# Patient Record
Sex: FEMALE | Race: WHITE | NOT HISPANIC OR LATINO | Employment: OTHER | URBAN - METROPOLITAN AREA
[De-identification: names, ages, dates, MRNs, and addresses within clinical notes are randomized per-mention and may not be internally consistent; named-entity substitution may affect disease eponyms.]

---

## 2017-01-09 ENCOUNTER — HOSPITAL ENCOUNTER (OUTPATIENT)
Dept: LAB | Facility: MEDICAL CENTER | Age: 67
End: 2017-01-09
Attending: FAMILY MEDICINE
Payer: MEDICARE

## 2017-01-09 LAB
25(OH)D3 SERPL-MCNC: 44 NG/ML (ref 30–100)
DHEA-S SERPL-MCNC: 96.5 UG/DL (ref 9.4–246)
EST. AVERAGE GLUCOSE BLD GHB EST-MCNC: 120 MG/DL
ESTRADIOL SERPL-MCNC: <20 PG/ML
HBA1C MFR BLD: 5.8 % (ref 0–5.6)
PROGEST SERPL-MCNC: 3.59 NG/ML

## 2017-01-09 PROCEDURE — 83525 ASSAY OF INSULIN: CPT

## 2017-01-09 PROCEDURE — 82306 VITAMIN D 25 HYDROXY: CPT

## 2017-01-09 PROCEDURE — 84403 ASSAY OF TOTAL TESTOSTERONE: CPT

## 2017-01-09 PROCEDURE — 82627 DEHYDROEPIANDROSTERONE: CPT

## 2017-01-09 PROCEDURE — 82947 ASSAY GLUCOSE BLOOD QUANT: CPT

## 2017-01-09 PROCEDURE — 84144 ASSAY OF PROGESTERONE: CPT

## 2017-01-09 PROCEDURE — 36415 COLL VENOUS BLD VENIPUNCTURE: CPT

## 2017-01-09 PROCEDURE — 83036 HEMOGLOBIN GLYCOSYLATED A1C: CPT

## 2017-01-09 PROCEDURE — 84270 ASSAY OF SEX HORMONE GLOBUL: CPT

## 2017-01-09 PROCEDURE — 82670 ASSAY OF TOTAL ESTRADIOL: CPT

## 2017-01-10 LAB — GLUCOSE SERPL-MCNC: 87 MG/DL (ref 65–99)

## 2017-01-11 LAB — INSULIN P FAST SERPL-ACNC: 10 UIU/ML (ref 3–19)

## 2017-01-12 LAB
SHBG SERPL-SCNC: 52 NMOL/L (ref 30–135)
TESTOST FREE SERPL-MCNC: 3.7 PG/ML (ref 0.6–3.8)
TESTOST SERPL-MCNC: 29 NG/DL (ref 5–32)

## 2017-05-03 ENCOUNTER — TELEPHONE (OUTPATIENT)
Dept: MEDICAL GROUP | Facility: MEDICAL CENTER | Age: 67
End: 2017-05-03

## 2017-05-03 ENCOUNTER — DOCUMENTATION (OUTPATIENT)
Dept: MEDICAL GROUP | Facility: MEDICAL CENTER | Age: 67
End: 2017-05-03

## 2017-05-03 NOTE — TELEPHONE ENCOUNTER
ANNUAL WELLNESS VISIT PRE-VISIT PLANNING     1.  Reviewed note from last office visit with PCP: YES 7/28/16    2.  If any orders were placed at last visit, do we have Results/Consult Notes?        •  Labs - Labs were not ordered at last office visit.       •  Imaging - Imaging was not ordered at last office visit.       •  Referrals - No referrals were ordered at last office visit.    3.  Immunizations were updated in [facility] using WebIZ?: Yes       •  WebIZ Recommendations: HEPATITIS A , HEPATITIS B, PREVNAR (PCV13) , TDAP and ZOSTAVAX (Shingles)       •  Is patient due for Tdap? YES. Patient was notified of copay.       •  Is patient due for Shingles? YES. Patient was not notified of copay.     4.  Patient is due for the following Health Maintenance Topics:   Health Maintenance Due   Topic Date Due   • Annual Wellness Visit  1950   • IMM DTaP/Tdap/Td Vaccine (1 - Tdap) 05/27/1967   • PAP SMEAR  05/27/1969   • MAMMOGRAM  05/27/1988   • COLONOSCOPY  06/13/2007   • IMM ZOSTER VACCINE  05/27/2008   • BONE DENSITY  05/27/2013   • IMM PNEUMOCOCCAL 65+ (ADULT) LOW/MEDIUM RISK SERIES (1 of 2 - PCV13) 05/27/2013       - Patient plans to schedule appointment for Colonoscopy/FIT, Immunizations: pt would like to discuss with provider. , Mammogram and Pap.    5.  Reviewed/Updated the following with patient:       •   Preferred Pharmacy? YES       •   Preferred Lab? YES       •   Medications? NO       •   Social History? NO       •   Family History? NO    6.  Care Team Updated:       •   DME Company (gait device, O2, CPAP, etc.): NO       •   Other Specialists (eye doctor, derm, GYN, cardiology, endo, etc): YES    7.  Patient has the following Care Path diagnoses on Problem List:  DEPRESSION     Is patient seeing a counselor, psychiatrist or other healthcare provider regarding their mental health? No, and not interested.     No flowsheet data found.    Interpretation of PHQ-9 Total Score   Score Severity   1-4 Minimal  Depression   5-9 Mild Depression   10-14 Moderate Depression   15-19 Moderately Severe Depression   20-27 Severe Depression       8.  Specialty Comments was updated with diagnosis information provided by [facility]: YES    9.  Patient was advised: “This is a free wellness visit. The provider will screen for medical conditions to help you stay healthy. If you have other concerns to address you may be asked to discuss these at a separate visit or there may be an additional fee.”     10.  Patient was informed to arrive 15 min prior to their scheduled appointment and bring in their medication bottles?  YES

## 2017-05-03 NOTE — TELEPHONE ENCOUNTER
PVP COMPLETE    LAST EYE EXAM-will ask at appointment    ADVANCE DIRECTIVE-will ask at appointment    SPECIALTY COMMENT-n/a      VACCINES-pt would like to discuss with provider.

## 2017-05-10 ENCOUNTER — HOSPITAL ENCOUNTER (OUTPATIENT)
Facility: MEDICAL CENTER | Age: 67
End: 2017-05-10
Attending: FAMILY MEDICINE
Payer: MEDICARE

## 2017-05-10 ENCOUNTER — OFFICE VISIT (OUTPATIENT)
Dept: MEDICAL GROUP | Facility: MEDICAL CENTER | Age: 67
End: 2017-05-10
Payer: MEDICARE

## 2017-05-10 VITALS
TEMPERATURE: 98.2 F | OXYGEN SATURATION: 100 % | HEIGHT: 66 IN | BODY MASS INDEX: 29.89 KG/M2 | SYSTOLIC BLOOD PRESSURE: 114 MMHG | DIASTOLIC BLOOD PRESSURE: 62 MMHG | HEART RATE: 89 BPM | WEIGHT: 186 LBS

## 2017-05-10 DIAGNOSIS — J30.0 VASOMOTOR RHINITIS: ICD-10-CM

## 2017-05-10 DIAGNOSIS — H33.312 RETINAL TEAR OF LEFT EYE: ICD-10-CM

## 2017-05-10 DIAGNOSIS — Z12.11 SCREENING FOR COLON CANCER: ICD-10-CM

## 2017-05-10 DIAGNOSIS — Z00.00 MEDICARE ANNUAL WELLNESS VISIT, SUBSEQUENT: ICD-10-CM

## 2017-05-10 DIAGNOSIS — N95.1 MENOPAUSAL SYNDROME (HOT FLASHES): ICD-10-CM

## 2017-05-10 DIAGNOSIS — E66.9 OBESITY (BMI 30-39.9): ICD-10-CM

## 2017-05-10 DIAGNOSIS — E78.2 MIXED HYPERLIPIDEMIA: ICD-10-CM

## 2017-05-10 DIAGNOSIS — Z12.31 ENCOUNTER FOR SCREENING MAMMOGRAM FOR BREAST CANCER: ICD-10-CM

## 2017-05-10 DIAGNOSIS — F33.41 RECURRENT MAJOR DEPRESSIVE DISORDER, IN PARTIAL REMISSION (HCC): ICD-10-CM

## 2017-05-10 DIAGNOSIS — N18.30 CKD (CHRONIC KIDNEY DISEASE) STAGE 3, GFR 30-59 ML/MIN (HCC): ICD-10-CM

## 2017-05-10 DIAGNOSIS — R73.9 HYPERGLYCEMIA: ICD-10-CM

## 2017-05-10 PROCEDURE — 82274 ASSAY TEST FOR BLOOD FECAL: CPT

## 2017-05-10 PROCEDURE — 1036F TOBACCO NON-USER: CPT | Performed by: FAMILY MEDICINE

## 2017-05-10 PROCEDURE — 99999 PR INITIAL ANNUAL WELLNESS VISIT-INCLUDES PPPS: CPT | Performed by: FAMILY MEDICINE

## 2017-05-10 PROCEDURE — 99999 PR NO CHARGE: CPT | Performed by: FAMILY MEDICINE

## 2017-05-10 PROCEDURE — G0439 PPPS, SUBSEQ VISIT: HCPCS | Performed by: FAMILY MEDICINE

## 2017-05-10 ASSESSMENT — PATIENT HEALTH QUESTIONNAIRE - PHQ9
SUM OF ALL RESPONSES TO PHQ QUESTIONS 1-9: 10
5. POOR APPETITE OR OVEREATING: 2 - MORE THAN HALF THE DAYS
CLINICAL INTERPRETATION OF PHQ2 SCORE: 4

## 2017-05-10 NOTE — MR AVS SNAPSHOT
"Precious Peña   5/10/2017 9:00 AM   Office Visit   MRN: [id number]    Department:  [facility]   Dept Phone:  [phone]    Description:  Female : 1950   Provider:  Rajwinder Mike M.D.; [facility]            Reason for Visit     Annual Wellness Visit           Allergies as of 5/10/2017     No Known Allergies      You were diagnosed with     Medicare annual wellness visit, subsequent   [719628]       Recurrent major depressive disorder, in partial remission (CMS-HCC)   [2783153]       Screening for colon cancer   [561902]       Hyperglycemia   [173161]       CKD (chronic kidney disease) stage 3, GFR 30-59 ml/min   [491191]       Menopausal syndrome (hot flashes)   [798204]       Mixed hyperlipidemia   [272.2.ICD-9-CM]       Retinal tear of left eye   [7012779]       Vasomotor rhinitis   [478281]       Obesity (BMI 30-39.9)   [175082]       Encounter for screening mammogram for breast cancer   [2104997]         Vital Signs     Blood Pressure Pulse Temperature Height Weight Body Mass Index    114/62 mmHg 89 36.8 °C (98.2 °F) 1.67 m (5' 5.75\") 84.369 kg (186 lb) 30.25 kg/m2    Oxygen Saturation Smoking Status                100% Former Smoker          Basic Information     Date Of Birth Sex Race Ethnicity Preferred Language    1950 Female White Non- English      Problem List              ICD-10-CM Priority Class Noted - Resolved    Acne L70.9   Unknown - Present    Depression F32.9   Unknown - Present    Mixed hyperlipidemia E78.2   Unknown - Present    Hyperglycemia R73.9   Unknown - Present    CKD (chronic kidney disease) stage 3, GFR 30-59 ml/min N18.3   2016 - Present    Menopausal syndrome (hot flashes) N95.1   2016 - Present    Vasomotor rhinitis J30.0   2016 - Present    Screening for colon cancer Z12.11   2016 - Present    Family history of colon cancer Z80.0   2016 - Present    Retinal tear of left eye H33.002   Unknown - Present   " Obesity (BMI 30-39.9) E66.9   5/10/2017 - Present      Health Maintenance        Date Due Completion Dates    MAMMOGRAM 5/27/1988 ---    COLONOSCOPY 6/13/2007 6/13/1997 (Done)    Override on 6/13/1997: Done    BONE DENSITY 5/27/2013 ---    IMM PNEUMOCOCCAL 65+ (ADULT) LOW/MEDIUM RISK SERIES (1 of 2 - PCV13) 5/10/2018 (Originally 5/27/2013) ---    IMM DTaP/Tdap/Td Vaccine (1 - Tdap) 5/10/2018 (Originally 5/27/1967) ---    IMM ZOSTER VACCINE 5/10/2018 (Originally 5/27/2008) ---            Current Immunizations     Influenza Vaccine Quad Inj (Preserved) 9/22/2016      Below and/or attached are the medications your provider expects you to take. Review all of your home medications and newly ordered medications with your provider and/or pharmacist. Follow medication instructions as directed by your provider and/or pharmacist. Please keep your medication list with you and share with your provider. Update the information when medications are discontinued, doses are changed, or new medications (including over-the-counter products) are added; and carry medication information at all times in the event of emergency situations     Allergies:  No Known Allergies          Medications  Valid as of: May 10, 2017 - 10:22 AM    Generic Name Brand Name Tablet Size Instructions for use    BuPROPion HCl (TABLET SR 24 HR) WELLBUTRIN  MG Take 1 Tab by mouth every morning.        Coenzyme Q10 (Cap) coenzyme Q-10 30 MG Take 60 mg by mouth every day.        Dapsone (Gel) Dapsone 5 % by Apply externally route.        Doxycycline Hyclate (Tab) VIBRAMYCIN 100 MG Take 100 mg by mouth 2 times a day.        Estradiol (Tab) ESTRACE 1 MG Take 1 Tab by mouth every 48 hours.        Estradiol (Tab) ESTRACE 0.5 MG Take 1 Tab by mouth every 48 hours.        Ezetimibe (Tab) ZETIA 10 MG TAKE ONE TABLET BY MOUTH ONCE DAILY        Fenofibrate (Tab) TRICOR 145 MG Take 1 Tab by mouth every day.        Ipratropium Bromide (Solution) ATROVENT 0.03 % Spray 2  Sprays in nose every 12 hours.        Misc Natural Products   Take  by mouth.        NON SPECIFIED   Indications: Raw Kidney Supplement to help with kidneys        Nutritional Supplements   Take  by mouth.        Omega-3 Fatty Acids   Take  by mouth.        Progesterone Micronized (Cap) PROMETRIUM 100 MG Take 100 mg by mouth every day.        Testosterone (Gel) Testosterone 5.5 MG/ACT Spray  in nose.        Tretinoin (Gel) RETIN-A 0.01 % Apply  to affected area(s) every evening.        .                 Medicines prescribed today were sent to:     [facility] [address] [place] [address] - [address] [place] [address] [place] [address]    Phone: [phone] Fax: [phone]    Open 24 Hours?: No      Medication refill instructions:       If your prescription bottle indicates you have medication refills left, it is not necessary to call your provider’s office. Please contact your pharmacy and they will refill your medication.    If your prescription bottle indicates you do not have any refills left, you may request refills at any time through one of the following ways: The online [employer] system (except Urgent Care), by calling your provider’s office, or by asking your pharmacy to contact your provider’s office with a refill request. Medication refills are processed only during regular business hours and may not be available until the next business day. Your provider may request additional information or to have a follow-up visit with you prior to refilling your medication.   *Please Note: Medication refills are assigned a new Rx number when refilled electronically. Your pharmacy may indicate that no refills were authorized even though a new prescription for the same medication is available at the pharmacy. Please request the medicine by name with the pharmacy before contacting your provider for a refill.        Your To Do List     Future Labs/Procedures Complete By Expires    MA-SCREEN MAMMO W/CAD-BILAT   As directed 6/11/2018    OCCULT BLOOD FECES IMMUNOASSAY  As directed 5/10/2018    RENAL FUNCTION PANEL  As directed 5/10/2018    URINALYSIS,CULTURE IF INDICATED  As directed 5/10/2018      Referral     A referral request has been sent to our patient care coordination department. Please allow 3-5 business days for us to process this request and contact you either by phone or mail. If you do not hear from us by the 5th business day, please call us at [phone].           [employer] Access Code: Activation code not generated  Current Brand[employer] Status: Active

## 2017-05-10 NOTE — PROGRESS NOTES
Chief Complaint   Patient presents with   • Annual Wellness Visit         HPI:  Precious Peña is a 66 y.o. female here for Medicare Annual Wellness Visit        Patient Active Problem List    Diagnosis Date Noted   • Obesity (BMI 30-39.9) 05/10/2017   • Retinal tear of left eye    • CKD (chronic kidney disease) stage 3, GFR 30-59 ml/min 06/09/2016   • Menopausal syndrome (hot flashes) 06/09/2016   • Vasomotor rhinitis 06/09/2016   • Screening for colon cancer 06/09/2016   • Family history of colon cancer 06/09/2016   • Acne    • Depression    • Mixed hyperlipidemia    • Hyperglycemia        Current Outpatient Prescriptions   Medication Sig Dispense Refill   • Misc Natural Products (ADRENAL PO) Take  by mouth.     • Nutritional Supplements (ADRENAL COMPLEX PO) Take  by mouth.     • progesterone (PROMETRIUM) 100 MG Cap Take 100 mg by mouth every day.     • Testosterone 5.5 MG/ACT Gel Spray  in nose.     • ZETIA 10 MG Tab TAKE ONE TABLET BY MOUTH ONCE DAILY 90 Tab 2   • buPROPion (WELLBUTRIN XL) 300 MG XL tablet Take 1 Tab by mouth every morning. 90 Tab 1   • doxycycline (VIBRAMYCIN) 100 MG Tab Take 100 mg by mouth 2 times a day.     • tretinoin (RETIN-A) 0.01 % gel Apply  to affected area(s) every evening.     • estradiol (ESTRACE) 1 MG Tab Take 1 Tab by mouth every 48 hours.     • fenofibrate (TRICOR) 145 MG Tab Take 1 Tab by mouth every day. 90 Tab 3   • ipratropium (ATROVENT) 0.03 % Solution Spray 2 Sprays in nose every 12 hours. 3 Bottle 3   • NON SPECIFIED Indications: Raw Kidney Supplement to help with kidneys     • coenzyme Q-10 30 MG capsule Take 60 mg by mouth every day.     • Omega-3 Fatty Acids (OMEGA 3 PO) Take  by mouth.     • Dapsone 5 % Gel by Apply externally route.     • estradiol (ESTRACE) 0.5 MG tablet Take 1 Tab by mouth every 48 hours. (Patient not taking: Reported on 5/10/2017)       No current facility-administered medications for this visit.        Patient is taking medications as noted in  medication list.  Current supplements as per medication list.   Chronic narcotic pain medicines: no    Allergies: Review of patient's allergies indicates no known allergies.    Current social contact/activities: Pt is always keeping herself busy with errands.      Is patient current with immunizations?  No, due for PREVNAR (PCV13) , TDAP and ZOSTAVAX (Shingles). Patient is interested in receiving NONE today.     She  reports that she quit smoking about 19 years ago. Her smoking use included Cigarettes. She has a 2.5 pack-year smoking history. She has never used smokeless tobacco. She reports that she drinks alcohol. She reports that she does not use illicit drugs.  Counseling given: Not Answered        DPA/Advanced Directive:  Patient does not have an Advanced Directive.  A packet and workshop information was given on Advanced Directives.    ROS:    Gait: Uses no assistive device   Ostomy: no   Other tubes: no   Amputations: no   Chronic oxygen use: no   Last eye exam: Every Six months    Wears hearing aids: no   : Denies incontinence.       Screening:  DEPRESSION   1. Date of last PHQ9 and score? 10    2. Is patient seeing a counselor, psychiatrist or other healthcare provider regarding their mood? no      a. If yes, CareTeam was updated: No.  Date of last visit: 3-5 years ago       b. If no, is patient interested in seeing a mental health provider? yes          Depression Screening    Little interest or pleasure in doing things?  2 - more than half the days  Feeling down, depressed , or hopeless? 2 - more than half the days  Trouble falling or staying asleep, or sleeping too much?  0 - not at all  Feeling tired or having little energy?  2 - more than half the days  Poor appetite or overeating?  2 - more than half the days  Feeling bad about yourself - or that you are a failure or have let yourself or your family down? 2 - more than half the days  Trouble concentrating on things, such as reading the newspaper or  watching television? 0 - not at all  Moving or speaking so slowly that other people could have noticed.  Or the opposite - being so fidgety or restless that you have been moving around a lot more than usual?  0 - not at all  Thoughts that you would be better off dead, or of hurting yourself?  0 - not at all  Patient Health Questionnaire Score: 10  If depressive symptoms identified deferred to follow up visit unless specifically addressed in assessment and plan.    Screening for Cognitive Impairment    Three Minute Recall (banana, sunrise, fence)  3/3    Draw clock face with all 12 numbers set to the hand to show 10 minutes past 11 o'clock  1 5/5  If cognitive concerns identified deferred to follow up visit unless specifically addressed in assessment and plan.    Fall Risk Assessment    Has the patient had two or more falls in the last year or any fall with injury in the last year?  No  If Fall Risk identified deferred to follow up visit unless specifically addressed in assessment and plan.    Safety Assessment    Throw rugs on floor.  Yes  Handrails on all stairs.  Yes  Good lighting in all hallways.  Yes  Difficulty hearing.  No  Patient counseled about all safety risks that were identified.    Functional Assessment ADLs    Are there any barriers preventing you from cooking for yourself or meeting nutritional needs?  No.    Are there any barriers preventing you from driving safely or obtaining transportation?  No.    Are there any barriers preventing you from using a telephone or calling for help?  No.    Are there any barriers preventing you from shopping?  No.    Are there any barriers preventing you from taking care of your own finances?  No.    Are there any barriers preventing you from managing your medications?  No.    Are currently engaging any exercise or physical activity?  No.       Health Maintenance Summary                Annual Wellness Visit Overdue 1950     IMM DTaP/Tdap/Td Vaccine Overdue  "5/27/1967     PAP SMEAR Overdue 5/27/1969     MAMMOGRAM Overdue 5/27/1988     COLONOSCOPY Overdue 6/13/2007      Done 6/13/1997     IMM ZOSTER VACCINE Overdue 5/27/2008     BONE DENSITY Overdue 5/27/2013     IMM PNEUMOCOCCAL 65+ (ADULT) LOW/MEDIUM RISK SERIES Overdue 5/27/2013           Patient Care Team:  Rajwinder Mike M.D. as PCP - General (Family Medicine)  Abdirizak Hull D.O. as Consulting Physician (Family Medicine)  Nico Hutchison M.D. as Consulting Physician (Ophthalmology)  Abdirizak Moody M.D. as Consulting Physician (Dermatology)      Social History   Substance Use Topics   • Smoking status: Former Smoker -- 0.50 packs/day for 5 years     Types: Cigarettes     Quit date: 06/09/1997   • Smokeless tobacco: Never Used   • Alcohol Use: Yes      Comment: once a month     Family History   Problem Relation Age of Onset   • Diabetes Paternal Grandmother    • Cancer Mother      lung   • Cancer Father      colon     She  has a past medical history of Acne; Depression; Hyperlipidemia; Hyperglycemia; Kidney disease; Tuberculosis (1960); CKD (chronic kidney disease) stage 3, GFR 30-59 ml/min (6/9/2016); Menopausal syndrome (hot flashes) (6/9/2016); Vasomotor rhinitis (6/9/2016); Family history of colon cancer (6/9/2016); and Retinal tear of left eye.   Past Surgical History   Procedure Laterality Date   • Dilation and curettage       for bleeding - had 4-5 times before RK   • Abdominal hysterectomy total  2000     Bleeding/BSO   • Breast reconstruction       lift           Exam:     Blood pressure 114/62, pulse 89, temperature 36.8 °C (98.2 °F), height 1.67 m (5' 5.75\"), weight 84.369 kg (186 lb), SpO2 100 %. Body mass index is 30.25 kg/(m^2).    Hearing good.    Dentition good  Alert, oriented in no acute distress.  Eye contact is good, speech goal directed, affect calm      Assessment and Plan. The following treatment and monitoring plan is recommended:    1. Medicare annual wellness visit, " subsequent  Routine anticipatory guidance  - Initial Wellness Visit - Includes PPPS ()    2. Recurrent major depressive disorder, in partial remission (CMS-HCC)    Patient is currently on Wellbutrin but does not feel like this is working as well as it has in the past. Should like to see a psychiatrist and a psychologist. She denies any suicidal thoughts or plans.  - REFERRAL TO PSYCHIATRY  - REFERRAL TO PSYCHOLOGY    3. Screening for colon cancer  Patient refuses colonoscopy but will do Fit test  - OCCULT BLOOD FECES IMMUNOASSAY; Future    4. Hyperglycemia  Dietary counseling done. Encourage weight loss. Recheck labs in 6 months    5. CKD (chronic kidney disease) stage 3, GFR 30-59 ml/min  Avoid NSAIDs. Recheck kidney function as well as urinalysis  - RENAL FUNCTION PANEL; Future  - URINALYSIS,CULTURE IF INDICATED; Future    6. Menopausal syndrome (hot flashes)  Patient is currently hormone replacement therapy. She understands the risks and this is being followed by another physician.    7. Mixed hyperlipidemia  Continue TriCor and Zetia. Encourage weight loss and increase exercise. Recheck labs in 6 months    8. Retinal tear of left eye  Follow-up with ophthalmology as scheduled    9. Vasomotor rhinitis  Continue ipratropium nasal spray    10. Obesity (BMI 30-39.9)  Encourage weight loss  - Patient identified as having weight management issue.  Appropriate orders and counseling given.    11. Encounter for screening mammogram for breast cancer    - MA-SCREEN MAMMO W/CAD-BILAT; Future          Services suggested: No services needed at this time  Health Care Screening recommendations as per orders if indicated.  Referrals offered: PT/OT/Nutrition counseling/Behavioral Health/Smoking cessation as per orders if indicated.    Discussion today about general wellness and lifestyle habits:    · Prevent falls and reduce trip hazards; Cautioned about securing or removing rugs.  · Have a working fire alarm and carbon  monoxide detector;   · Engage in regular physical activity and social activities       Follow-up: Return if symptoms worsen or fail to improve.

## 2017-05-11 ENCOUNTER — HOSPITAL ENCOUNTER (OUTPATIENT)
Dept: LAB | Facility: MEDICAL CENTER | Age: 67
End: 2017-05-11
Attending: FAMILY MEDICINE
Payer: MEDICARE

## 2017-05-11 DIAGNOSIS — N18.30 CKD (CHRONIC KIDNEY DISEASE) STAGE 3, GFR 30-59 ML/MIN (HCC): ICD-10-CM

## 2017-05-11 LAB
ALBUMIN SERPL BCP-MCNC: 4.3 G/DL (ref 3.2–4.9)
APPEARANCE UR: ABNORMAL
BACTERIA #/AREA URNS HPF: ABNORMAL /HPF
BILIRUB UR QL STRIP.AUTO: NEGATIVE
BUN SERPL-MCNC: 19 MG/DL (ref 8–22)
CALCIUM SERPL-MCNC: 10 MG/DL (ref 8.5–10.5)
CAOX CRY #/AREA URNS HPF: ABNORMAL /HPF
CHLORIDE SERPL-SCNC: 107 MMOL/L (ref 96–112)
CO2 SERPL-SCNC: 26 MMOL/L (ref 20–33)
COLOR UR: YELLOW
CREAT SERPL-MCNC: 1.14 MG/DL (ref 0.5–1.4)
CULTURE IF INDICATED INDCX: NO UA CULTURE
EPI CELLS #/AREA URNS HPF: ABNORMAL /HPF
GFR SERPL CREATININE-BSD FRML MDRD: 47 ML/MIN/1.73 M 2
GLUCOSE SERPL-MCNC: 90 MG/DL (ref 65–99)
GLUCOSE UR STRIP.AUTO-MCNC: NEGATIVE MG/DL
KETONES UR STRIP.AUTO-MCNC: NEGATIVE MG/DL
LEUKOCYTE ESTERASE UR QL STRIP.AUTO: NEGATIVE
MICRO URNS: ABNORMAL
NITRITE UR QL STRIP.AUTO: NEGATIVE
PH UR STRIP.AUTO: 6 [PH]
PHOSPHATE SERPL-MCNC: 4.1 MG/DL (ref 2.5–4.5)
POTASSIUM SERPL-SCNC: 4.2 MMOL/L (ref 3.6–5.5)
PROT UR QL STRIP: NEGATIVE MG/DL
RBC # URNS HPF: ABNORMAL /HPF
RBC UR QL AUTO: NEGATIVE
SODIUM SERPL-SCNC: 140 MMOL/L (ref 135–145)
SP GR UR STRIP.AUTO: 1.02
WBC #/AREA URNS HPF: ABNORMAL /HPF

## 2017-05-11 PROCEDURE — 80069 RENAL FUNCTION PANEL: CPT

## 2017-05-11 PROCEDURE — 81001 URINALYSIS AUTO W/SCOPE: CPT

## 2017-05-11 PROCEDURE — 36415 COLL VENOUS BLD VENIPUNCTURE: CPT

## 2017-05-12 DIAGNOSIS — N18.30 CKD (CHRONIC KIDNEY DISEASE) STAGE 3, GFR 30-59 ML/MIN (HCC): ICD-10-CM

## 2017-05-15 ENCOUNTER — TELEPHONE (OUTPATIENT)
Dept: MEDICAL GROUP | Facility: MEDICAL CENTER | Age: 67
End: 2017-05-15

## 2017-05-15 NOTE — TELEPHONE ENCOUNTER
1. Caller Name: Pt                      Call Back Number: [phone]    2. Message: Pt called and was notified of recent lab results and notified to have renal US done. Pt is wondering how much worse her kidney function has gotten. She said last time she was told she was at a 3 and would like to know where she is at now.     3. Patient approves office to leave a detailed voicemail/MyChart message: yes

## 2017-05-16 DIAGNOSIS — Z12.11 SCREENING FOR COLON CANCER: ICD-10-CM

## 2017-05-16 LAB — HEMOCCULT STL QL IA: NEGATIVE

## 2017-05-16 NOTE — TELEPHONE ENCOUNTER
It is still CKD 3 but her renal function has decreased.  That is why I am recommending the ultrasound.  Rajwinder Mike M.D.

## 2017-05-26 ENCOUNTER — HOSPITAL ENCOUNTER (OUTPATIENT)
Dept: RADIOLOGY | Facility: MEDICAL CENTER | Age: 67
End: 2017-05-26
Attending: FAMILY MEDICINE
Payer: MEDICARE

## 2017-05-26 PROCEDURE — 76775 US EXAM ABDO BACK WALL LIM: CPT

## 2017-05-31 DIAGNOSIS — F32.A DEPRESSION, UNSPECIFIED DEPRESSION TYPE: ICD-10-CM

## 2017-06-01 RX ORDER — BUPROPION HYDROCHLORIDE 300 MG/1
300 TABLET ORAL EVERY MORNING
Qty: 90 TAB | Refills: 3 | Status: SHIPPED | OUTPATIENT
Start: 2017-06-01

## 2017-06-07 ENCOUNTER — OFFICE VISIT (OUTPATIENT)
Dept: NEPHROLOGY | Facility: MEDICAL CENTER | Age: 67
End: 2017-06-07
Payer: MEDICARE

## 2017-06-07 VITALS
TEMPERATURE: 98.4 F | WEIGHT: 184 LBS | HEART RATE: 80 BPM | DIASTOLIC BLOOD PRESSURE: 80 MMHG | RESPIRATION RATE: 16 BRPM | SYSTOLIC BLOOD PRESSURE: 120 MMHG | BODY MASS INDEX: 29.57 KG/M2 | HEIGHT: 66 IN

## 2017-06-07 DIAGNOSIS — N18.30 CKD (CHRONIC KIDNEY DISEASE) STAGE 3, GFR 30-59 ML/MIN (HCC): ICD-10-CM

## 2017-06-07 DIAGNOSIS — E55.9 VITAMIN D DEFICIENCY DISEASE: ICD-10-CM

## 2017-06-07 DIAGNOSIS — E78.2 MIXED HYPERLIPIDEMIA: ICD-10-CM

## 2017-06-07 DIAGNOSIS — I10 ESSENTIAL HYPERTENSION: ICD-10-CM

## 2017-06-07 PROCEDURE — G8432 DEP SCR NOT DOC, RNG: HCPCS | Performed by: INTERNAL MEDICINE

## 2017-06-07 PROCEDURE — 99214 OFFICE O/P EST MOD 30 MIN: CPT | Performed by: INTERNAL MEDICINE

## 2017-06-07 PROCEDURE — 1101F PT FALLS ASSESS-DOCD LE1/YR: CPT | Performed by: INTERNAL MEDICINE

## 2017-06-07 PROCEDURE — 1036F TOBACCO NON-USER: CPT | Performed by: INTERNAL MEDICINE

## 2017-06-07 PROCEDURE — 4040F PNEUMOC VAC/ADMIN/RCVD: CPT | Mod: 8P | Performed by: INTERNAL MEDICINE

## 2017-06-07 PROCEDURE — G8419 CALC BMI OUT NRM PARAM NOF/U: HCPCS | Performed by: INTERNAL MEDICINE

## 2017-06-07 PROCEDURE — 3014F SCREEN MAMMO DOC REV: CPT | Performed by: INTERNAL MEDICINE

## 2017-06-07 ASSESSMENT — ENCOUNTER SYMPTOMS
SPUTUM PRODUCTION: 0
MYALGIAS: 0
RESPIRATORY NEGATIVE: 1
SHORTNESS OF BREATH: 0
PALPITATIONS: 0
SORE THROAT: 0
ABDOMINAL PAIN: 0
ORTHOPNEA: 0
HEADACHES: 0
CHILLS: 0
HEMOPTYSIS: 0
DIARRHEA: 0
WHEEZING: 0
FLANK PAIN: 0
GASTROINTESTINAL NEGATIVE: 1
CARDIOVASCULAR NEGATIVE: 1
BACK PAIN: 0
NAUSEA: 0
WEIGHT LOSS: 0
VOMITING: 0
HEARTBURN: 0
CONSTITUTIONAL NEGATIVE: 1
COUGH: 0
EYES NEGATIVE: 1
FEVER: 0

## 2017-06-07 NOTE — MR AVS SNAPSHOT
"        Precious Peña   2017 2:15 PM   Office Visit   MRN: [id number]    Department:  [facility]   Dept Phone:  [phone]    Description:  Female : 1950   Provider:  Isabel Anaya M.D.           Reason for Visit     New Patient           Allergies as of 2017     No Known Allergies      You were diagnosed with     CKD (chronic kidney disease) stage 3, GFR 30-59 ml/min   [388584]       Essential hypertension   [9700150]       Mixed hyperlipidemia   [272.2.ICD-9-CM]       Vitamin D deficiency disease   [493189]         Vital Signs     Blood Pressure Pulse Temperature Respirations Height Weight    120/80 mmHg 80 36.9 °C (98.4 °F) (Temporal) 16 1.676 m (5' 6\") 83.462 kg (184 lb)    Body Mass Index Smoking Status                29.71 kg/m2 Former Smoker          Basic Information     Date Of Birth Sex Race Ethnicity Preferred Language    1950 Female White Non- English      Your appointments     2017  4:10 PM   MA SCRN10 with DAVI RODGERS MG 1   [facility] MAMMOGRAPHY ([place])    [address]  [place] [id number] [phone]           No deodorant, powder, perfume or lotion under the arm or breast area.            Sep 27, 2017  1:30 PM   Follow Up Visit with Isabel Anaya M.D.   [facility] ([address] [place] [address])    [facility] [address]  [place] [id number] [phone]           You will be receiving a confirmation call a few days before your appointment from our automated call confirmation system.              Problem List              ICD-10-CM Priority Class Noted - Resolved    Acne L70.9   Unknown - Present    Depression F32.9   Unknown - Present    Mixed hyperlipidemia E78.2   Unknown - Present    Hyperglycemia R73.9   Unknown - Present    CKD (chronic kidney disease) stage 3, GFR 30-59 ml/min N18.3   2016 - Present    Menopausal syndrome (hot flashes) N95.1   2016 - Present   " Vasomotor rhinitis J30.0   6/9/2016 - Present    Screening for colon cancer Z12.11   6/9/2016 - Present    Family history of colon cancer Z80.0   6/9/2016 - Present    Retinal tear of left eye H33.002   Unknown - Present    Obesity (BMI 30-39.9) E66.9   5/10/2017 - Present    Essential hypertension I10   6/7/2017 - Present      Health Maintenance        Date Due Completion Dates    MAMMOGRAM 5/27/1988 ---    COLONOSCOPY 6/13/2007 6/13/1997 (Done)    Override on 6/13/1997: Done    BONE DENSITY 5/27/2013 ---    IMM PNEUMOCOCCAL 65+ (ADULT) LOW/MEDIUM RISK SERIES (1 of 2 - PCV13) 5/10/2018 (Originally 5/27/2013) ---    IMM DTaP/Tdap/Td Vaccine (1 - Tdap) 5/10/2018 (Originally 5/27/1967) ---    IMM ZOSTER VACCINE 5/10/2018 (Originally 5/27/2008) ---            Current Immunizations     Influenza Vaccine Quad Inj (Preserved) 9/22/2016      Below and/or attached are the medications your provider expects you to take. Review all of your home medications and newly ordered medications with your provider and/or pharmacist. Follow medication instructions as directed by your provider and/or pharmacist. Please keep your medication list with you and share with your provider. Update the information when medications are discontinued, doses are changed, or new medications (including over-the-counter products) are added; and carry medication information at all times in the event of emergency situations     Allergies:  No Known Allergies          Medications  Valid as of: June 07, 2017 -  2:16 PM    Generic Name Brand Name Tablet Size Instructions for use    Bisacodyl   Take  by mouth.        BuPROPion HCl (TABLET SR 24 HR) WELLBUTRIN  MG Take 1 Tab by mouth every morning.        Cholecalciferol (Liquid) Vitamin D3 5000 UNIT/ML Take  by mouth.        Coenzyme Q10 (Cap) coenzyme Q-10 30 MG Take 60 mg by mouth every day.        Dapsone (Gel) Dapsone 5 % by Apply externally route.        Doxycycline Hyclate (Tab) VIBRAMYCIN 100 MG  Take 100 mg by mouth 2 times a day.        Estradiol (Tab) ESTRACE 1 MG Take 1 Tab by mouth every 48 hours.        Estradiol (Tab) ESTRACE 0.5 MG Take 1 Tab by mouth every 48 hours.        Ezetimibe (Tab) ZETIA 10 MG TAKE ONE TABLET BY MOUTH ONCE DAILY        Fenofibrate (Tab) TRICOR 145 MG Take 1 Tab by mouth every day.        Ipratropium Bromide (Solution) ATROVENT 0.03 % Spray 2 Sprays in nose every 12 hours.        Misc Natural Products   Take  by mouth.        NON SPECIFIED   Indications: Raw Kidney Supplement to help with kidneys        Nutritional Supplements   Take  by mouth.        Omega-3 Fatty Acids   Take  by mouth.        Progesterone Micronized (Cap) PROMETRIUM 100 MG Take 100 mg by mouth every day.        Testosterone (Gel) Testosterone 5.5 MG/ACT Spray  in nose.        Tretinoin (Gel) RETIN-A 0.01 % Apply  to affected area(s) every evening.        .                 Medicines prescribed today were sent to:     [facility] [address] [place] [id number]    Phone: [phone] Fax: [phone]    Open 24 Hours?: No      Medication refill instructions:       If your prescription bottle indicates you have medication refills left, it is not necessary to call your provider’s office. Please contact your pharmacy and they will refill your medication.    If your prescription bottle indicates you do not have any refills left, you may request refills at any time through one of the following ways: The online [employer] system (except Urgent Care), by calling your provider’s office, or by asking your pharmacy to contact your provider’s office with a refill request. Medication refills are processed only during regular business hours and may not be available until the next business day. Your provider may request additional information or to have a follow-up visit with you prior to refilling your medication.   *Please Note: Medication refills are assigned a new  Rx number when refilled electronically. Your pharmacy may indicate that no refills were authorized even though a new prescription for the same medication is available at the pharmacy. Please request the medicine by name with the pharmacy before contacting your provider for a refill.        Your To Do List     Future Labs/Procedures Complete By Expires    BASIC METABOLIC PANEL  As directed 12/5/2017         [email or web address] Access Code: Activation code not generated  Current Newsle Status: Active

## 2017-06-07 NOTE — PROGRESS NOTES
"Subjective:      Precious Peña is a 67 y.o. female who presents with New Patient and Chronic Kidney Disease            HPI  Prceious is coming today for initial evaluation of CKD III  Doing well, has no complaints.  No difficulties to urinate: no dysuria/hematuria/flank pain  Baseline creat level at 0.95 -the recent one at 1.14  HTN: BP well controlled off BP medications  Renal US: unremarkable  Review of Systems   Constitutional: Negative.  Negative for fever, chills, weight loss and malaise/fatigue.   HENT: Negative.  Negative for congestion, nosebleeds and sore throat.    Eyes: Negative.    Respiratory: Negative.  Negative for cough, hemoptysis, sputum production, shortness of breath and wheezing.    Cardiovascular: Negative.  Negative for chest pain, palpitations, orthopnea and leg swelling.   Gastrointestinal: Negative.  Negative for heartburn, nausea, vomiting, abdominal pain and diarrhea.   Genitourinary: Negative.  Negative for dysuria, urgency, frequency, hematuria and flank pain.   Musculoskeletal: Negative for myalgias, back pain and joint pain.   Skin: Negative.  Negative for itching and rash.   Neurological: Negative for headaches.   All other systems reviewed and are negative.    PMH/FH/SH/allergies and medications reviewed     Objective:     /80 mmHg  Pulse 80  Temp(Src) 36.9 °C (98.4 °F) (Temporal)  Resp 16  Ht 1.676 m (5' 6\")  Wt 83.462 kg (184 lb)  BMI 29.71 kg/m2     Physical Exam   Constitutional: She is oriented to person, place, and time. She appears well-developed and well-nourished. No distress.   HENT:   Head: Normocephalic and atraumatic.   Nose: Nose normal.   Mouth/Throat: Oropharynx is clear and moist.   Eyes: Conjunctivae and EOM are normal. Pupils are equal, round, and reactive to light. No scleral icterus.   Neck: Normal range of motion. Neck supple.   Cardiovascular: Normal rate and regular rhythm.  Exam reveals no friction rub.    Pulmonary/Chest: Effort normal and " breath sounds normal. No respiratory distress. She has no wheezes. She has no rales.   Abdominal: Soft. Bowel sounds are normal. She exhibits no distension and no mass. There is no tenderness.   Musculoskeletal: She exhibits no edema.   Neurological: She is alert and oriented to person, place, and time. No cranial nerve deficit. Coordination normal.   Skin: Skin is warm. No rash noted. No erythema.   Nursing note and vitals reviewed.            Laboratory results reviewed:d/wPt  Lab Results   Component Value Date/Time    CREATININE 1.14 05/11/2017 11:56 AM    POTASSIUM 4.2 05/11/2017 11:56 AM         Assessment/Plan:     1. CKD (chronic kidney disease) stage 3, GFR 30-59 ml/min      To monitor , avoid NSAID's  - BASIC METABOLIC PANEL; Future    2. Essential hypertension     BP well controlled -no need for BP meds    3. Mixed hyperlipidemia      To monitor      Low cholesterol diet    4. Vitamin D deficiency disease    - VITAMIN D 25-HYDROXY    Recs; keep well hydrated, low Na diet             F/u in 3 months    Thank you for the consult

## 2017-06-21 ENCOUNTER — HOSPITAL ENCOUNTER (OUTPATIENT)
Dept: LAB | Facility: MEDICAL CENTER | Age: 67
End: 2017-06-21
Attending: FAMILY MEDICINE
Payer: MEDICARE

## 2017-06-21 LAB
25(OH)D3 SERPL-MCNC: 39 NG/ML (ref 30–100)
ALBUMIN SERPL BCP-MCNC: 4.1 G/DL (ref 3.2–4.9)
ALBUMIN/GLOB SERPL: 1.5 G/DL
ALP SERPL-CCNC: 52 U/L (ref 30–99)
ALT SERPL-CCNC: 23 U/L (ref 2–50)
ANION GAP SERPL CALC-SCNC: 8 MMOL/L (ref 0–11.9)
AST SERPL-CCNC: 17 U/L (ref 12–45)
BASOPHILS # BLD AUTO: 0.9 % (ref 0–1.8)
BASOPHILS # BLD: 0.06 K/UL (ref 0–0.12)
BILIRUB SERPL-MCNC: 0.4 MG/DL (ref 0.1–1.5)
BUN SERPL-MCNC: 22 MG/DL (ref 8–22)
CALCIUM SERPL-MCNC: 9.6 MG/DL (ref 8.5–10.5)
CHLORIDE SERPL-SCNC: 108 MMOL/L (ref 96–112)
CHOLEST SERPL-MCNC: 199 MG/DL (ref 100–199)
CO2 SERPL-SCNC: 25 MMOL/L (ref 20–33)
CREAT SERPL-MCNC: 0.98 MG/DL (ref 0.5–1.4)
CRP SERPL HS-MCNC: 3.3 MG/L (ref 0–7.5)
DHEA-S SERPL-MCNC: 501.8 UG/DL (ref 9.4–246)
EOSINOPHIL # BLD AUTO: 0.1 K/UL (ref 0–0.51)
EOSINOPHIL NFR BLD: 1.6 % (ref 0–6.9)
ERYTHROCYTE [DISTWIDTH] IN BLOOD BY AUTOMATED COUNT: 42.3 FL (ref 35.9–50)
EST. AVERAGE GLUCOSE BLD GHB EST-MCNC: 120 MG/DL
ESTRADIOL SERPL-MCNC: 26 PG/ML
GFR SERPL CREATININE-BSD FRML MDRD: 56 ML/MIN/1.73 M 2
GLOBULIN SER CALC-MCNC: 2.8 G/DL (ref 1.9–3.5)
GLUCOSE SERPL-MCNC: 92 MG/DL (ref 65–99)
HBA1C MFR BLD: 5.8 % (ref 0–5.6)
HCT VFR BLD AUTO: 42.9 % (ref 37–47)
HCYS SERPL-SCNC: 15.37 UMOL/L
HDLC SERPL-MCNC: 59 MG/DL
HGB BLD-MCNC: 13.8 G/DL (ref 12–16)
IMM GRANULOCYTES # BLD AUTO: 0.01 K/UL (ref 0–0.11)
IMM GRANULOCYTES NFR BLD AUTO: 0.2 % (ref 0–0.9)
LDLC SERPL CALC-MCNC: 114 MG/DL
LYMPHOCYTES # BLD AUTO: 2.65 K/UL (ref 1–4.8)
LYMPHOCYTES NFR BLD: 41.5 % (ref 22–41)
MCH RBC QN AUTO: 27.9 PG (ref 27–33)
MCHC RBC AUTO-ENTMCNC: 32.2 G/DL (ref 33.6–35)
MCV RBC AUTO: 86.8 FL (ref 81.4–97.8)
MONOCYTES # BLD AUTO: 0.42 K/UL (ref 0–0.85)
MONOCYTES NFR BLD AUTO: 6.6 % (ref 0–13.4)
NEUTROPHILS # BLD AUTO: 3.14 K/UL (ref 2–7.15)
NEUTROPHILS NFR BLD: 49.2 % (ref 44–72)
NRBC # BLD AUTO: 0 K/UL
NRBC BLD AUTO-RTO: 0 /100 WBC
PLATELET # BLD AUTO: 330 K/UL (ref 164–446)
PMV BLD AUTO: 9.5 FL (ref 9–12.9)
POTASSIUM SERPL-SCNC: 4 MMOL/L (ref 3.6–5.5)
PROGEST SERPL-MCNC: 6.39 NG/ML
PROT SERPL-MCNC: 6.9 G/DL (ref 6–8.2)
RBC # BLD AUTO: 4.94 M/UL (ref 4.2–5.4)
SODIUM SERPL-SCNC: 141 MMOL/L (ref 135–145)
T3FREE SERPL-MCNC: 3.07 PG/ML (ref 2.4–4.2)
T4 SERPL-MCNC: 6.9 UG/DL (ref 4–12)
TRIGL SERPL-MCNC: 130 MG/DL (ref 0–149)
TSH SERPL DL<=0.005 MIU/L-ACNC: 4.29 UIU/ML (ref 0.3–3.7)
WBC # BLD AUTO: 6.4 K/UL (ref 4.8–10.8)

## 2017-06-21 PROCEDURE — 83036 HEMOGLOBIN GLYCOSYLATED A1C: CPT | Mod: GA

## 2017-06-21 PROCEDURE — 84436 ASSAY OF TOTAL THYROXINE: CPT

## 2017-06-21 PROCEDURE — 36415 COLL VENOUS BLD VENIPUNCTURE: CPT

## 2017-06-21 PROCEDURE — 80053 COMPREHEN METABOLIC PANEL: CPT

## 2017-06-21 PROCEDURE — 82306 VITAMIN D 25 HYDROXY: CPT

## 2017-06-21 PROCEDURE — 84481 FREE ASSAY (FT-3): CPT

## 2017-06-21 PROCEDURE — 86141 C-REACTIVE PROTEIN HS: CPT

## 2017-06-21 PROCEDURE — 84403 ASSAY OF TOTAL TESTOSTERONE: CPT

## 2017-06-21 PROCEDURE — 83525 ASSAY OF INSULIN: CPT

## 2017-06-21 PROCEDURE — 84305 ASSAY OF SOMATOMEDIN: CPT

## 2017-06-21 PROCEDURE — 84144 ASSAY OF PROGESTERONE: CPT

## 2017-06-21 PROCEDURE — 80061 LIPID PANEL: CPT

## 2017-06-21 PROCEDURE — 82670 ASSAY OF TOTAL ESTRADIOL: CPT

## 2017-06-21 PROCEDURE — 82627 DEHYDROEPIANDROSTERONE: CPT

## 2017-06-21 PROCEDURE — 83090 ASSAY OF HOMOCYSTEINE: CPT

## 2017-06-21 PROCEDURE — 84443 ASSAY THYROID STIM HORMONE: CPT

## 2017-06-21 PROCEDURE — 85025 COMPLETE CBC W/AUTO DIFF WBC: CPT

## 2017-06-21 PROCEDURE — 84270 ASSAY OF SEX HORMONE GLOBUL: CPT

## 2017-06-22 RX ORDER — FENOFIBRATE 145 MG/1
TABLET, COATED ORAL
Qty: 90 TAB | Refills: 3 | Status: SHIPPED | OUTPATIENT
Start: 2017-06-22

## 2017-06-23 LAB
IGF-I SERPL-MCNC: 190 NG/ML (ref 75–263)
INSULIN P FAST SERPL-ACNC: 14 UIU/ML (ref 3–19)
SHBG SERPL-SCNC: 32 NMOL/L (ref 30–135)
TESTOST FREE SERPL-MCNC: 5.2 PG/ML (ref 0.6–3.8)
TESTOST SERPL-MCNC: 31 NG/DL (ref 5–32)

## 2017-07-03 ENCOUNTER — HOSPITAL ENCOUNTER (OUTPATIENT)
Dept: RADIOLOGY | Facility: MEDICAL CENTER | Age: 67
End: 2017-07-03
Attending: FAMILY MEDICINE
Payer: MEDICARE

## 2017-07-03 DIAGNOSIS — Z12.31 ENCOUNTER FOR SCREENING MAMMOGRAM FOR BREAST CANCER: ICD-10-CM

## 2017-07-03 PROCEDURE — 77063 BREAST TOMOSYNTHESIS BI: CPT

## 2017-07-26 ENCOUNTER — HOSPITAL ENCOUNTER (OUTPATIENT)
Dept: RADIOLOGY | Facility: MEDICAL CENTER | Age: 67
End: 2017-07-26

## 2017-08-14 NOTE — TELEPHONE ENCOUNTER
Was the patient seen in the last year in this department? Yes     Does patient have an active prescription for medications requested? No     Received Request Via: Pharmacy     Last seen: 05/10/2017 Smith

## 2017-08-17 RX ORDER — EZETIMIBE 10 MG/1
TABLET ORAL
Qty: 90 TAB | Refills: 2 | Status: SHIPPED | OUTPATIENT
Start: 2017-08-17

## 2017-08-24 ENCOUNTER — TELEPHONE (OUTPATIENT)
Dept: MEDICAL GROUP | Facility: MEDICAL CENTER | Age: 67
End: 2017-08-24

## 2017-08-24 NOTE — TELEPHONE ENCOUNTER
Spoke with patient, she states that she figured out the cost that would be more cost effective for her. But she is stating that since she started the Zetia, she is experiencing body aches. Would like to know if it would be better to stop the Zetia and increase the Fenofibrate?

## 2017-08-24 NOTE — TELEPHONE ENCOUNTER
Regarding: FW: Prescription Question  Contact: [phone]  Please call and ask what she needs from us  Rajwinder Mike M.D.    ----- Message -----     From: Precious Peña     Sent: 8/21/2017   3:24 PM       To: Rajwinder Mike M.D.  Subject: Prescription Question                            ----- Message from Bryan Sprague Ass't sent at 8/21/2017  3:24 PM PDT -----       ----- Message from Precious Peña to Rajwinder Mike M.D. sent at 8/21/2017  3:13 PM -----   I talked to pharm., I am having them send me a request for assistance for Sudarshan. Please call me [phone]. Thank you

## 2017-08-29 ENCOUNTER — OFFICE VISIT (OUTPATIENT)
Dept: MEDICAL GROUP | Facility: MEDICAL CENTER | Age: 67
End: 2017-08-29
Payer: MEDICARE

## 2017-08-29 VITALS
HEIGHT: 66 IN | TEMPERATURE: 98 F | BODY MASS INDEX: 29.89 KG/M2 | SYSTOLIC BLOOD PRESSURE: 120 MMHG | DIASTOLIC BLOOD PRESSURE: 74 MMHG | HEART RATE: 85 BPM | WEIGHT: 186 LBS | OXYGEN SATURATION: 99 %

## 2017-08-29 DIAGNOSIS — M79.10 MYALGIA: ICD-10-CM

## 2017-08-29 DIAGNOSIS — E78.2 MIXED HYPERLIPIDEMIA: ICD-10-CM

## 2017-08-29 PROCEDURE — 99214 OFFICE O/P EST MOD 30 MIN: CPT | Performed by: FAMILY MEDICINE

## 2017-08-29 RX ORDER — THYROID,PORK 32.5 MG
TABLET ORAL
Refills: 1 | COMMUNITY
Start: 2017-07-25

## 2017-08-29 ASSESSMENT — PATIENT HEALTH QUESTIONNAIRE - PHQ9: CLINICAL INTERPRETATION OF PHQ2 SCORE: 0

## 2017-09-05 DIAGNOSIS — J30.0 VASOMOTOR RHINITIS: ICD-10-CM

## 2017-09-05 RX ORDER — IPRATROPIUM BROMIDE 21 UG/1
SPRAY, METERED NASAL
Qty: 3 BOTTLE | Refills: 3 | Status: SHIPPED | OUTPATIENT
Start: 2017-09-05

## 2017-09-05 NOTE — ASSESSMENT & PLAN NOTE
Patient complains of diffuse myalgias. She reports with any activity she gets very sore. She thinks that this is related to the Zetia. She was unable to tolerate statins because of this complaint. She was tried on a multitude of different statins with the same side effects. She reports when she doesn't take the Zetia her cholesterol climbed to 300. She denies any dark urine or jaundice.

## 2017-09-05 NOTE — PROGRESS NOTES
Subjective:     Chief Complaint   Patient presents with   • Results     lab work       Precious Peña is a 67 y.o. female here today for evaluation and management of:    Myalgia  Patient complains of diffuse myalgias. She reports with any activity she gets very sore. She thinks that this is related to the Zetia. She was unable to tolerate statins because of this complaint. She was tried on a multitude of different statins with the same side effects. She reports when she doesn't take the Zetia her cholesterol climbed to 300. She denies any dark urine or jaundice.       Allergies   Allergen Reactions   • Statins [Hmg-Coa-R Inhibitors] Unspecified     Has tried multiple statins       Current medicines (including changes today)  Current Outpatient Prescriptions   Medication Sig Dispense Refill   • WP THYROID 32.5 MG tablet   1   • ezetimibe (ZETIA) 10 MG Tab TAKE ONE TABLET BY MOUTH ONCE DAILY 90 Tab 2   • fenofibrate (TRICOR) 145 MG Tab TAKE ONE TABLET BY MOUTH ONCE DAILY 90 Tab 3   • Cholecalciferol (VITAMIN D3) 5000 UNIT/ML Liquid Take  by mouth.     • Bisacodyl (LAXATIVE PO) Take  by mouth.     • buPROPion (WELLBUTRIN XL) 300 MG XL tablet Take 1 Tab by mouth every morning. 90 Tab 3   • progesterone (PROMETRIUM) 100 MG Cap Take 100 mg by mouth every day.     • Testosterone 5.5 MG/ACT Gel Spray  in nose.     • tretinoin (RETIN-A) 0.01 % gel Apply  to affected area(s) every evening.     • estradiol (ESTRACE) 1 MG Tab Take 1 Tab by mouth every 48 hours.     • ipratropium (ATROVENT) 0.03 % Solution Spray 2 Sprays in nose every 12 hours. 3 Bottle 3   • Misc Natural Products (ADRENAL PO) Take  by mouth.     • Nutritional Supplements (ADRENAL COMPLEX PO) Take  by mouth.     • NON SPECIFIED Indications: Raw Kidney Supplement to help with kidneys     • coenzyme Q-10 30 MG capsule Take 60 mg by mouth every day.     • Omega-3 Fatty Acids (OMEGA 3 PO) Take  by mouth.     • doxycycline (VIBRAMYCIN) 100 MG Tab Take 100 mg by  "mouth 2 times a day.     • Dapsone 5 % Gel by Apply externally route.     • estradiol (ESTRACE) 0.5 MG tablet Take 1 Tab by mouth every 48 hours.       No current facility-administered medications for this visit.        She  has a past medical history of Acne; CKD (chronic kidney disease) stage 3, GFR 30-59 ml/min (6/9/2016); Depression; Family history of colon cancer (6/9/2016); Hyperglycemia; Hyperlipidemia; Kidney disease; Menopausal syndrome (hot flashes) (6/9/2016); Retinal tear of left eye; Tuberculosis (1960); and Vasomotor rhinitis (6/9/2016).    Patient Active Problem List    Diagnosis Date Noted   • Myalgia 08/29/2017   • Essential hypertension 06/07/2017   • Obesity (BMI 30-39.9) 05/10/2017   • Retinal tear of left eye    • CKD (chronic kidney disease) stage 3, GFR 30-59 ml/min 06/09/2016   • Menopausal syndrome (hot flashes) 06/09/2016   • Vasomotor rhinitis 06/09/2016   • Screening for colon cancer 06/09/2016   • Family history of colon cancer 06/09/2016   • Acne    • Depression    • Mixed hyperlipidemia    • Hyperglycemia        ROS   No fever or chills.  No nausea or vomiting.  No chest pain or palpitations.  No cough or SOB.  No pain with urination or hematuria.  No black or bloody stools.       Objective:     Blood pressure 120/74, pulse 85, temperature 36.7 °C (98 °F), height 1.676 m (5' 6\"), weight 84.4 kg (186 lb), SpO2 99 %. Body mass index is 30.02 kg/m².   Physical Exam:  Well developed, well nourished.  Alert, oriented in no acute distress.  Eye contact is good, speech goal directed, affect calm  Eyes: conjunctiva non-injected, sclera non-icteric.  Lungs: clear to auscultation bilaterally with good excursion. No wheezes or rhonchi  CV: regular rate and rhythm. No murmur  Abdomen: soft, nontender, no masses or organomegaly.  No rebound or guarding            Assessment and Plan:   The following treatment plan was discussed    1. Myalgia  Discussed discussed at length with patient that she " could do a trial of stopping the Zetia for a month and see if the symptoms resolve. The fenofibrate, also be causing the symptoms. We consider a referral to Dr. Bloch to discuss options.    2. Mixed hyperlipidemia  See #1    Any change or worsening of signs or symptoms, patient encouraged to follow-up or report to the emergency room for further evaluation. Patient understands and agrees.  Spent 30 minutes in face-to-face patient contact in which greater than 50% of the visit was spent in counseling/coordination of care     Followup: Return in about 3 months (around 11/29/2017).

## 2017-09-13 ENCOUNTER — HOSPITAL ENCOUNTER (OUTPATIENT)
Dept: LAB | Facility: MEDICAL CENTER | Age: 67
End: 2017-09-13
Attending: INTERNAL MEDICINE
Payer: MEDICARE

## 2017-09-13 DIAGNOSIS — N18.30 CKD (CHRONIC KIDNEY DISEASE) STAGE 3, GFR 30-59 ML/MIN (HCC): ICD-10-CM

## 2017-09-13 LAB
25(OH)D3 SERPL-MCNC: 45 NG/ML (ref 30–100)
ANION GAP SERPL CALC-SCNC: 12 MMOL/L (ref 0–11.9)
BUN SERPL-MCNC: 18 MG/DL (ref 8–22)
CALCIUM SERPL-MCNC: 9.6 MG/DL (ref 8.5–10.5)
CHLORIDE SERPL-SCNC: 104 MMOL/L (ref 96–112)
CO2 SERPL-SCNC: 24 MMOL/L (ref 20–33)
CREAT SERPL-MCNC: 1.03 MG/DL (ref 0.5–1.4)
GFR SERPL CREATININE-BSD FRML MDRD: 53 ML/MIN/1.73 M 2
GLUCOSE SERPL-MCNC: 73 MG/DL (ref 65–99)
POTASSIUM SERPL-SCNC: 4 MMOL/L (ref 3.6–5.5)
SODIUM SERPL-SCNC: 140 MMOL/L (ref 135–145)

## 2017-09-13 PROCEDURE — 82306 VITAMIN D 25 HYDROXY: CPT

## 2017-09-13 PROCEDURE — 80048 BASIC METABOLIC PNL TOTAL CA: CPT

## 2017-09-13 PROCEDURE — 36415 COLL VENOUS BLD VENIPUNCTURE: CPT

## 2017-09-27 ENCOUNTER — OFFICE VISIT (OUTPATIENT)
Dept: NEPHROLOGY | Facility: MEDICAL CENTER | Age: 67
End: 2017-09-27
Payer: MEDICARE

## 2017-09-27 VITALS
BODY MASS INDEX: 29.89 KG/M2 | HEART RATE: 88 BPM | OXYGEN SATURATION: 98 % | RESPIRATION RATE: 16 BRPM | HEIGHT: 66 IN | TEMPERATURE: 98 F | SYSTOLIC BLOOD PRESSURE: 108 MMHG | WEIGHT: 186 LBS | DIASTOLIC BLOOD PRESSURE: 60 MMHG

## 2017-09-27 DIAGNOSIS — N18.30 CKD (CHRONIC KIDNEY DISEASE) STAGE 3, GFR 30-59 ML/MIN (HCC): ICD-10-CM

## 2017-09-27 DIAGNOSIS — E78.2 MIXED HYPERLIPIDEMIA: ICD-10-CM

## 2017-09-27 DIAGNOSIS — I10 ESSENTIAL HYPERTENSION: ICD-10-CM

## 2017-09-27 PROCEDURE — 99213 OFFICE O/P EST LOW 20 MIN: CPT | Performed by: INTERNAL MEDICINE

## 2017-09-27 ASSESSMENT — ENCOUNTER SYMPTOMS
SHORTNESS OF BREATH: 0
CARDIOVASCULAR NEGATIVE: 1
SORE THROAT: 0
CONSTITUTIONAL NEGATIVE: 1
ABDOMINAL PAIN: 0
BACK PAIN: 0
COUGH: 0
CHILLS: 0
HEADACHES: 0
PALPITATIONS: 0
FLANK PAIN: 0
WEIGHT LOSS: 0
NAUSEA: 0
MYALGIAS: 0
VOMITING: 0
GASTROINTESTINAL NEGATIVE: 1
WHEEZING: 0
SPUTUM PRODUCTION: 0
ORTHOPNEA: 0
FEVER: 0
HEMOPTYSIS: 0
EYES NEGATIVE: 1
RESPIRATORY NEGATIVE: 1
DIARRHEA: 0
HEARTBURN: 0

## 2017-09-27 NOTE — PROGRESS NOTES
"Subjective:      Precious Peña is a 67 y.o. female who presents with Follow-Up and Chronic Kidney Disease            Chronic Kidney Disease   Pertinent negatives include no abdominal pain, chest pain, chills, congestion, coughing, fever, headaches, myalgias, nausea, rash, sore throat or vomiting.     Precious is coming today for f/u of CKD III  C/o weight gain , back and legs pain  No difficulties to urinate: no dysuria/hematuria/flank pain  Baseline creat level at 0.95 -the recent one at 1.03  HTN: BP well controlled off BP medications  Renal US: unremarkable  Review of Systems   Constitutional: Negative.  Negative for chills, fever, malaise/fatigue and weight loss.   HENT: Negative.  Negative for congestion, nosebleeds and sore throat.    Eyes: Negative.    Respiratory: Negative.  Negative for cough, hemoptysis, sputum production, shortness of breath and wheezing.    Cardiovascular: Negative.  Negative for chest pain, palpitations, orthopnea and leg swelling.   Gastrointestinal: Negative.  Negative for abdominal pain, diarrhea, heartburn, nausea and vomiting.   Genitourinary: Negative.  Negative for dysuria, flank pain, frequency, hematuria and urgency.   Musculoskeletal: Negative for back pain, joint pain and myalgias.   Skin: Negative.  Negative for itching and rash.   Neurological: Negative for headaches.   All other systems reviewed and are negative.    PMH/FH/SH/allergies and medications reviewed     Objective:     /60   Pulse 88   Temp 36.7 °C (98 °F)   Resp 16   Ht 1.676 m (5' 6\")   Wt 84.4 kg (186 lb)   SpO2 98%   BMI 30.02 kg/m²      Physical Exam   Constitutional: She is oriented to person, place, and time. She appears well-developed and well-nourished. No distress.   HENT:   Head: Normocephalic and atraumatic.   Nose: Nose normal.   Mouth/Throat: Oropharynx is clear and moist.   Eyes: Conjunctivae and EOM are normal. Pupils are equal, round, and reactive to light. No scleral icterus. "   Neck: Normal range of motion. Neck supple.   Cardiovascular: Normal rate and regular rhythm.  Exam reveals no friction rub.    Pulmonary/Chest: Effort normal and breath sounds normal. No respiratory distress. She has no wheezes. She has no rales.   Abdominal: Soft. Bowel sounds are normal. She exhibits no distension and no mass. There is no tenderness.   Musculoskeletal: She exhibits no edema.   Neurological: She is alert and oriented to person, place, and time. No cranial nerve deficit. Coordination normal.   Skin: Skin is warm. No rash noted. No erythema.   Nursing note and vitals reviewed.            Laboratory results reviewed:d/wPt  Lab Results   Component Value Date/Time    CREATININE 1.03 09/13/2017 11:13 AM    POTASSIUM 4.0 09/13/2017 11:13 AM         Assessment/Plan:     1. CKD (chronic kidney disease) stage 3, GFR 30-59 ml/min      To monitor , avoid NSAID's      2. Essential hypertension     BP well controlled -no need for BP meds    3. Mixed hyperlipidemia      To monitor      Low cholesterol diet    4. Vitamin D deficiency disease    - well controlled    Recs; keep well hydrated, low Na diet             F/u in 6 months

## 2017-09-29 DIAGNOSIS — I10 ESSENTIAL HYPERTENSION: ICD-10-CM

## 2017-09-29 DIAGNOSIS — K76.0 FATTY LIVER: ICD-10-CM

## 2017-09-29 DIAGNOSIS — R73.9 HYPERGLYCEMIA: ICD-10-CM

## 2017-09-29 DIAGNOSIS — N18.30 CKD (CHRONIC KIDNEY DISEASE) STAGE 3, GFR 30-59 ML/MIN (HCC): ICD-10-CM

## 2017-09-29 DIAGNOSIS — E66.9 OBESITY (BMI 30-39.9): ICD-10-CM

## 2017-09-29 DIAGNOSIS — E78.2 MIXED HYPERLIPIDEMIA: ICD-10-CM

## 2017-12-14 ENCOUNTER — NON-PROVIDER VISIT (OUTPATIENT)
Dept: MEDICAL GROUP | Facility: MEDICAL CENTER | Age: 67
End: 2017-12-14
Payer: MEDICARE

## 2017-12-14 VITALS — BODY MASS INDEX: 29.89 KG/M2 | HEIGHT: 66 IN | WEIGHT: 186 LBS

## 2017-12-14 PROCEDURE — G0447 BEHAVIOR COUNSEL OBESITY 15M: HCPCS | Performed by: FAMILY MEDICINE

## 2017-12-14 NOTE — PROGRESS NOTES
"IBT INITIAL ASSESMENT    Author: Maryuri Santos Date & Time created: 12/14/2017  11:21 AM   Visit #: 1  Referring Provider: Rajwinder Mike M.D.  Patient Age: 67 y.o.  Time in/Out:   10:40-11:05am     ASSESS:  Vitals:    12/14/17 1117   Weight: 84.4 kg (186 lb)   Height: 1.676 m (5' 6\")      Vitals:    12/14/17 1117   Weight: 84.4 kg (186 lb)   Height: 1.676 m (5' 6\")    Body mass index is 30.02 kg/m².   Goal Weight:   -30lb     The patient states health, less pain, and vanity reason as motivators for weight loss and has tried weight watchers and other diets for weight loss in the past with out lasting success.    Comorbidities include CKD stage III, hyperglycemia, HLD, HTN      Current Dietary/Exercise Habits  1.  How many servings of fruits and vegetables do you eat in a typical day?  1-2 each.   2. How many servings of whole grains do you eat in a typical day?  About half or less   3. How many times in a typical week do you eat foods high in fat or eat at a fast food restaurant?   At least once   4. How many times in a typical week do you eat red meat, pork, or processed meat?   A few   5. How many days a week do you participate in moderately intense physical activity for 30 minutes?   None   6. How many days a week do you participate in vigorously intense physical activity for 20 minutes?  None   7. How many days a week do you do strength training exercise?  None   8. How many meals and snacks do you eat in a typical day? 3 meals and grazes on snacks, especially at night .   9. Beverages: sweetened tea with honey , water, and coffee with creamer.   10. Nutrition History/other: Pt states she is an emotional eater and has been eating a lot of sugar and sweets lately. She has depression and admits to stressors at home that lead her to eating an talon eating. She has gained 30-35lb over the past several years and is upset about that. She is interested in using a weight loss medication to assist with her diet efforts. " She doesn't feel she can do it alone.     Estimated Stage of Change   Contemplation as evidenced by wanting to make a change but not yet confident in her ability.    ADVISE:    Physical Activity:  Not discussed today.      Dietary Guidelines:  Today pt was introduced to the plate method. She was also given the Carbs to Avoid list. Recommended she cut out sweets and processed carbs.     The patient has been advised of how weight management and physical activity impacts their health and will help to reduce complications and health risk factors.    AGREE:  Goals:   1. Avoid sweets (added sugars) and processed carbs   3. Limit carbs to 1 choices per meal max from the list provided   4. Use the plate method to include 1/2 plate ns veggies and 4oz protein per meal with 1-2 healthy fats.   5. No sweetened beverages     ASSIST:  Referred pt to Medical Weight Management program at [facility] to meet with Dr. Noble to discuss medication options and meal replacements. She would be interested in using 1 meal replacement per day (likely for a snack between lunch and dinner). She has a strong addiction to carbs presently and would benefit from a low carb diet to get off that habit. Given her admission to depression and emotional eating, pt would benefit from seeing a therapist (she was hesitant about it but willing to discuss with Dr. Noble next week).   Pt needs her meal plan (LCD handout), and snack list next appt.   She will continue to meet with me at IBT until her BMI drops below 30.     ARRANGE:     Return for follow-up in 2 weeks on 12/28   The patient was assisted in making follow-up appointment per orders.

## 2017-12-20 ENCOUNTER — APPOINTMENT (OUTPATIENT)
Dept: HEALTH INFORMATION MANAGEMENT | Facility: MEDICAL CENTER | Age: 67
End: 2017-12-20
Payer: MEDICARE

## 2018-08-13 ENCOUNTER — PATIENT OUTREACH (OUTPATIENT)
Dept: HEALTH INFORMATION MANAGEMENT | Facility: OTHER | Age: 68
End: 2018-08-13

## 2018-09-04 NOTE — PROGRESS NOTES
Outcome: Left Message    Please transfer to Patient Outreach Team at [phone] when patient returns call.    Attempt # 2

## 2018-09-21 NOTE — PROGRESS NOTES
Outcome: NO ANSWER     Please transfer to Patient Outreach Team at [phone] when patient returns call.     Attempt # 4

## 2021-01-15 DIAGNOSIS — Z23 NEED FOR VACCINATION: ICD-10-CM
